# Patient Record
Sex: MALE | Race: WHITE | URBAN - METROPOLITAN AREA
[De-identification: names, ages, dates, MRNs, and addresses within clinical notes are randomized per-mention and may not be internally consistent; named-entity substitution may affect disease eponyms.]

---

## 2018-01-17 NOTE — PATIENT DISCUSSION
PTERYGIUM, OD:  STABLE. PRESCRIBE ARTIFICIAL TEARS AS NEEDED AND INCREASE UV PROTECTION. CONSIDER CORNEAL CONSULT IF CROSSES INTO LINE OF VISION.

## 2020-01-22 ENCOUNTER — IMPORTED ENCOUNTER (OUTPATIENT)
Dept: URBAN - METROPOLITAN AREA CLINIC 50 | Facility: CLINIC | Age: 65
End: 2020-01-22

## 2020-02-26 ENCOUNTER — IMPORTED ENCOUNTER (OUTPATIENT)
Dept: URBAN - METROPOLITAN AREA CLINIC 50 | Facility: CLINIC | Age: 65
End: 2020-02-26

## 2020-02-27 ENCOUNTER — IMPORTED ENCOUNTER (OUTPATIENT)
Dept: URBAN - METROPOLITAN AREA CLINIC 50 | Facility: CLINIC | Age: 65
End: 2020-02-27

## 2020-02-28 ENCOUNTER — IMPORTED ENCOUNTER (OUTPATIENT)
Dept: URBAN - METROPOLITAN AREA CLINIC 50 | Facility: CLINIC | Age: 65
End: 2020-02-28

## 2021-01-21 ENCOUNTER — IMPORTED ENCOUNTER (OUTPATIENT)
Dept: URBAN - METROPOLITAN AREA CLINIC 50 | Facility: CLINIC | Age: 66
End: 2021-01-21

## 2021-02-01 ENCOUNTER — IMPORTED ENCOUNTER (OUTPATIENT)
Dept: URBAN - METROPOLITAN AREA CLINIC 50 | Facility: CLINIC | Age: 66
End: 2021-02-01

## 2021-04-17 ASSESSMENT — TONOMETRY
OS_IOP_MMHG: 14
OS_IOP_MMHG: 15
OD_IOP_MMHG: 15
OD_IOP_MMHG: 14

## 2021-04-17 ASSESSMENT — VISUAL ACUITY
OS_OTHER: 20/25. 20/30.
OD_BAT: 20/25
OS_BAT: 20/20
OS_BAT: 20/25
OD_CC: J1
OD_CC: 20/25
OD_CC: 20/20
OS_CC: 20/20
OS_OTHER: 20/20. 20/25.
OD_OTHER: 20/25. 20/30.
OS_CC: J1+
OS_CC: 20/20
OD_OTHER: 20/25. 20/25.
OD_CC: J1+
OS_CC: J1
OD_BAT: 20/25

## 2021-11-03 NOTE — PATIENT DISCUSSION
Discussed drop regiment with patient today. Continue Xiidra OU BID. Start PF tears OU 3-4x/day or as needed. Start warm compresses OU BID. Start Lid scrubs OU BID. Start gel drops OU QHS. Discussed the importance of continue OTC regiment with Xiidra for dry eye. Leonel Bella done in office [facility], [facility] unable to upload images), images reviewed with patient. RTC in 3 months for dry eye f/u with Dr. Jody Hopkins.

## 2022-01-27 ENCOUNTER — PREPPED CHART (OUTPATIENT)
Dept: URBAN - METROPOLITAN AREA CLINIC 50 | Facility: CLINIC | Age: 67
End: 2022-01-27

## 2022-02-03 ENCOUNTER — COMPREHENSIVE EXAM (OUTPATIENT)
Dept: URBAN - METROPOLITAN AREA CLINIC 50 | Facility: CLINIC | Age: 67
End: 2022-02-03

## 2022-02-03 DIAGNOSIS — H40.013: ICD-10-CM

## 2022-02-03 DIAGNOSIS — Z97.3: ICD-10-CM

## 2022-02-03 DIAGNOSIS — H25.13: ICD-10-CM

## 2022-02-03 DIAGNOSIS — H52.4: ICD-10-CM

## 2022-02-03 PROCEDURE — 92133 CPTRZD OPH DX IMG PST SGM ON: CPT

## 2022-02-03 PROCEDURE — 92014 COMPRE OPH EXAM EST PT 1/>: CPT

## 2022-02-03 PROCEDURE — 92310-0E ESTABLISHED CL PATIENT NO ADJUSTMENT

## 2022-02-03 ASSESSMENT — KERATOMETRY
OD_AXISANGLE_DEGREES: 161
OS_AXISANGLE2_DEGREES: 087
OS_K1POWER_DIOPTERS: 42.00
OD_K2POWER_DIOPTERS: 42.25
OS_K2POWER_DIOPTERS: 42.25
OD_AXISANGLE2_DEGREES: 071
OD_K1POWER_DIOPTERS: 41.75
OS_AXISANGLE_DEGREES: 177

## 2022-02-03 ASSESSMENT — TONOMETRY
OD_IOP_MMHG: 14
OS_IOP_MMHG: 14

## 2022-02-03 ASSESSMENT — VISUAL ACUITY
OS_CC: 20/20-1
OU_CC: J1+
OU_CC: 20/15-2
OD_CC: 20/20-1

## 2022-03-02 NOTE — PATIENT DISCUSSION
Discussed drop regiment with patient today. Continue Xiidra OU BID. Continue PF tears OU 3-4x/day or as needed. Increase warm compresses OU BID. Continue Lid scrubs OU BID. Continue gel drops OU QHS. Discussed the importance of continue OTC regiment with Xiidra for dry eye. Deshaun Olmstead done in office at previous visit.

## 2022-07-28 ENCOUNTER — COMPREHENSIVE EXAM (OUTPATIENT)
Dept: URBAN - METROPOLITAN AREA CLINIC 50 | Facility: CLINIC | Age: 67
End: 2022-07-28

## 2022-07-28 DIAGNOSIS — H52.4: ICD-10-CM

## 2022-07-28 DIAGNOSIS — Z97.3: ICD-10-CM

## 2022-07-28 DIAGNOSIS — H25.13: ICD-10-CM

## 2022-07-28 PROCEDURE — 92014 COMPRE OPH EXAM EST PT 1/>: CPT

## 2022-07-28 PROCEDURE — 92015 DETERMINE REFRACTIVE STATE: CPT

## 2022-07-28 PROCEDURE — 92310-3E ESTABLISHED CL PATIENT MULTIFOCAL AND/OR MONOVISION SOFT LENS EVALUATION

## 2022-07-28 ASSESSMENT — VISUAL ACUITY
OS_GLARE: 20/40
OD_GLARE: 20/40
OU_CC: 20/20-1
OS_CC: 20/25
OD_CC: 20/25
OD_GLARE: 20/60
OU_CC: J1+@16"
OS_GLARE: 20/60

## 2022-07-28 ASSESSMENT — KERATOMETRY
OS_K1POWER_DIOPTERS: 42.25
OD_K2POWER_DIOPTERS: 42.00
OD_AXISANGLE_DEGREES: 145
OD_K1POWER_DIOPTERS: 41.75
OD_AXISANGLE2_DEGREES: 55
OS_AXISANGLE2_DEGREES: 74
OS_K2POWER_DIOPTERS: 42.50
OS_AXISANGLE_DEGREES: 164

## 2022-07-28 ASSESSMENT — TONOMETRY
OS_IOP_MMHG: 16
OD_IOP_MMHG: 16

## 2022-07-28 NOTE — PATIENT DISCUSSION
Discussed drop regiment with patient today. Continue Xiidra OU BID. Continue PF tears OU 3-4x/day or as needed. Increase warm compresses OU BID. Continue Lid scrubs OU BID. Continue gel drops OU QHS. Discussed the importance of continue OTC regiment with Xiidra for dry eye. Vicki Herrera done in office at previous visit.

## 2022-08-18 NOTE — PATIENT DISCUSSION
STABLE. PRESCRIBE ARTIFICIAL TEARS AS NEEDED AND INCREASE UV PROTECTION. CONSIDER CORNEAL CONSULT IF CROSSES INTO LINE OF VISION.

## 2022-08-18 NOTE — PATIENT DISCUSSION
VISUALLY SIGNIFICANT. OPTION OF YAG LASER VERSUS  UPDATING GLASSES VERSUS FOLLOWING DISCUSSED. RBA'S DISCUSSED, PATIENT UNDERSTANDS AND  DESIRES YAG LASER TO INCREASE VISION FOR READING STREET SIGNS, WATCHING TV AND DRIVING AT NIGHT DUE TO GLARE. SCHEDULE YAG LASER OD THEN OS.

## 2022-09-26 NOTE — PATIENT DISCUSSION
Pain around eyes and bitemporal region with headaches.  Pt. reports h/o headaches.  Also has hyperopic astigmatism and doesn't wear glasses all of the time.  Encouraged to wear glasses throughout day to reduce eye strain and recommend having nasal pads changed for better comfort.

## 2022-09-26 NOTE — PATIENT DISCUSSION
Discussed drop regiment with patient today. Continue Xiidra OU BID. Continue PF tears OU 3-4x/day or as needed. Increase warm compresses OU BID. Continue Lid scrubs OU BID. Continue gel drops OU QHS. Discussed the importance of continue OTC regiment with Xiidra for dry eye. Neoma Mass done in office at previous visit.

## 2023-01-23 ENCOUNTER — COMPREHENSIVE EXAM (OUTPATIENT)
Dept: URBAN - METROPOLITAN AREA CLINIC 24 | Facility: CLINIC | Age: 68
End: 2023-01-23

## 2023-01-23 DIAGNOSIS — H25.13: ICD-10-CM

## 2023-01-23 PROCEDURE — 92014 COMPRE OPH EXAM EST PT 1/>: CPT

## 2023-01-23 ASSESSMENT — KERATOMETRY
OS_K2POWER_DIOPTERS: 42.50
OS_AXISANGLE2_DEGREES: 74
OD_K2POWER_DIOPTERS: 42.00
OS_AXISANGLE_DEGREES: 164
OD_K1POWER_DIOPTERS: 41.75
OD_AXISANGLE2_DEGREES: 55
OD_AXISANGLE_DEGREES: 145
OS_K1POWER_DIOPTERS: 42.25

## 2023-01-23 ASSESSMENT — VISUAL ACUITY
OS_GLARE: 20/25
OU_CC: 20/20
OS_GLARE: 20/25
OD_CC: 20/20
OD_GLARE: 20/25
OD_GLARE: 20/25
OS_CC: 20/20

## 2023-04-06 ENCOUNTER — POST-OP (OUTPATIENT)
Dept: URBAN - METROPOLITAN AREA CLINIC 50 | Facility: CLINIC | Age: 68
End: 2023-04-06

## 2023-04-06 ENCOUNTER — SURGERY/PROCEDURE (OUTPATIENT)
Dept: URBAN - METROPOLITAN AREA SURGERY 16 | Facility: SURGERY | Age: 68
End: 2023-04-06

## 2023-04-06 DIAGNOSIS — Z98.42: ICD-10-CM

## 2023-04-06 DIAGNOSIS — Z96.1: ICD-10-CM

## 2023-04-06 DIAGNOSIS — H25.12: ICD-10-CM

## 2023-04-06 PROCEDURE — 66984AV REMOVE CATARACT, INSERT ADVANCED LENS

## 2023-04-06 PROCEDURE — 99199PAV ADVANCED VISION

## 2023-04-06 PROCEDURE — 99024 POSTOP FOLLOW-UP VISIT: CPT

## 2023-04-06 ASSESSMENT — VISUAL ACUITY: OS_SC: 20/60-1

## 2023-04-06 ASSESSMENT — TONOMETRY: OS_IOP_MMHG: 16

## 2023-04-12 ENCOUNTER — POST-OP (OUTPATIENT)
Dept: URBAN - METROPOLITAN AREA CLINIC 50 | Facility: CLINIC | Age: 68
End: 2023-04-12

## 2023-04-12 DIAGNOSIS — Z98.42: ICD-10-CM

## 2023-04-12 DIAGNOSIS — Z96.1: ICD-10-CM

## 2023-04-12 PROCEDURE — 99024 POSTOP FOLLOW-UP VISIT: CPT

## 2023-04-12 ASSESSMENT — VISUAL ACUITY
OD_SC: J2@16"
OU_SC: J1+@16"
OS_SC: J1@16"
OD_SC: 20/30@22"
OS_SC: 20/25-2
OD_SC: 20/30
OS_SC: 20/25@22"
OU_SC: 20/25@22"
OU_SC: 20/25-2

## 2023-04-12 ASSESSMENT — KERATOMETRY
OS_AXISANGLE_DEGREES: 7
OD_AXISANGLE_DEGREES: 151
OD_K2POWER_DIOPTERS: 42.00
OD_K1POWER_DIOPTERS: 41.50
OS_AXISANGLE2_DEGREES: 97
OD_AXISANGLE2_DEGREES: 61
OS_K1POWER_DIOPTERS: 42.00
OS_K2POWER_DIOPTERS: 42.75

## 2023-04-12 ASSESSMENT — TONOMETRY
OS_IOP_MMHG: 15
OD_IOP_MMHG: 15

## 2023-05-25 ENCOUNTER — POST-OP (OUTPATIENT)
Dept: URBAN - METROPOLITAN AREA CLINIC 50 | Facility: CLINIC | Age: 68
End: 2023-05-25

## 2023-05-25 DIAGNOSIS — Z96.1: ICD-10-CM

## 2023-05-25 PROCEDURE — 99024 POSTOP FOLLOW-UP VISIT: CPT

## 2023-05-25 RX ORDER — PREDNISOLONE ACETATE 10 MG/ML
1 SUSPENSION/ DROPS OPHTHALMIC TWICE A DAY
Start: 2023-05-25 | End: 2023-06-01

## 2023-05-25 ASSESSMENT — KERATOMETRY
OD_K2POWER_DIOPTERS: 42.50
OD_AXISANGLE_DEGREES: 158
OS_AXISANGLE2_DEGREES: 97
OD_K1POWER_DIOPTERS: 41.75
OS_AXISANGLE_DEGREES: 7
OS_K1POWER_DIOPTERS: 42.00
OS_K2POWER_DIOPTERS: 42.50
OD_AXISANGLE2_DEGREES: 68

## 2023-05-25 ASSESSMENT — VISUAL ACUITY
OD_GLARE: 20/20
OD_GLARE: 20/20
OS_SC: 20/20+2
OU_SC: 20/15-1
OS_SC: 20/25@22"
OD_SC: 20/20
OD_SC: 20/25@22"
OU_SC: J1@14"
OS_GLARE: 20/20
OD_SC: J1@14"
OS_GLARE: 20/20

## 2023-05-25 ASSESSMENT — TONOMETRY
OD_IOP_MMHG: 15
OS_IOP_MMHG: 15

## 2023-06-13 ENCOUNTER — FOLLOW UP (OUTPATIENT)
Dept: URBAN - METROPOLITAN AREA CLINIC 52 | Facility: CLINIC | Age: 68
End: 2023-06-13

## 2023-06-13 DIAGNOSIS — Z96.1: ICD-10-CM

## 2023-06-13 PROCEDURE — 92012 INTRM OPH EXAM EST PATIENT: CPT

## 2023-06-13 ASSESSMENT — KERATOMETRY
OS_AXISANGLE_DEGREES: 7
OD_K1POWER_DIOPTERS: 41.75
OD_K2POWER_DIOPTERS: 42.50
OS_K2POWER_DIOPTERS: 42.50
OD_AXISANGLE2_DEGREES: 68
OD_AXISANGLE_DEGREES: 158
OS_K1POWER_DIOPTERS: 42.00
OS_AXISANGLE2_DEGREES: 97

## 2023-06-13 ASSESSMENT — TONOMETRY
OD_IOP_MMHG: 15
OS_IOP_MMHG: 15

## 2023-06-13 ASSESSMENT — VISUAL ACUITY
OS_SC: 20/20
OD_SC: 20/25

## 2024-07-03 ENCOUNTER — COMPREHENSIVE EXAM (OUTPATIENT)
Dept: URBAN - METROPOLITAN AREA CLINIC 50 | Facility: LOCATION | Age: 69
End: 2024-07-03

## 2024-07-03 DIAGNOSIS — H52.4: ICD-10-CM

## 2024-07-03 DIAGNOSIS — H40.013: ICD-10-CM

## 2024-07-03 PROCEDURE — 92014 COMPRE OPH EXAM EST PT 1/>: CPT

## 2024-07-03 PROCEDURE — 92015 DETERMINE REFRACTIVE STATE: CPT

## 2024-07-03 ASSESSMENT — VISUAL ACUITY
OU_SC: 20/30
OS_SC: 20/30
OS_GLARE: 20/70-1
OU_SC: 20/20@24"
OD_SC: 20/40
OD_PH: 20/30-1
OD_GLARE: 20/80-1
OU_SC: J1+@16"

## 2024-07-03 ASSESSMENT — TONOMETRY
OS_IOP_MMHG: 10
OD_IOP_MMHG: 10

## 2024-08-19 ENCOUNTER — CONSULTATION/EVALUATION (OUTPATIENT)
Dept: URBAN - METROPOLITAN AREA CLINIC 50 | Facility: LOCATION | Age: 69
End: 2024-08-19

## 2024-08-19 DIAGNOSIS — H26.493: ICD-10-CM

## 2024-08-19 DIAGNOSIS — Z96.1: ICD-10-CM

## 2024-08-19 PROCEDURE — 66821 AFTER CATARACT LASER SURGERY: CPT

## 2024-08-19 PROCEDURE — 99214 OFFICE O/P EST MOD 30 MIN: CPT | Mod: 57

## 2024-08-19 RX ORDER — PREDNISOLONE ACETATE 10 MG/ML: 1 SUSPENSION/ DROPS OPHTHALMIC TWICE A DAY

## 2024-08-19 ASSESSMENT — VISUAL ACUITY
OU_SC: J1@16"
OD_PH: 20/30-1
OD_CC: 20/50-1
OS_GLARE: 20/70-1
OS_GLARE: 20/40
OS_CC: 20/40
OD_GLARE: 20/80-1
OU_SC: 20/25@24"
OS_PH: 20/20
OU_CC: 20/40
OD_GLARE: 20/50

## 2024-08-19 ASSESSMENT — TONOMETRY
OD_IOP_MMHG: 11
OS_IOP_MMHG: 11

## 2024-10-07 ENCOUNTER — POST-OP (OUTPATIENT)
Dept: URBAN - METROPOLITAN AREA CLINIC 50 | Facility: LOCATION | Age: 69
End: 2024-10-07

## 2024-10-07 DIAGNOSIS — Z98.890: ICD-10-CM

## 2024-10-07 PROCEDURE — 99024 POSTOP FOLLOW-UP VISIT: CPT
